# Patient Record
Sex: MALE
[De-identification: names, ages, dates, MRNs, and addresses within clinical notes are randomized per-mention and may not be internally consistent; named-entity substitution may affect disease eponyms.]

---

## 2023-03-17 ENCOUNTER — HOSPITAL ENCOUNTER (OUTPATIENT)
Dept: HOSPITAL 90 - RAH | Age: 78
Discharge: HOME | End: 2023-03-17
Attending: FAMILY MEDICINE
Payer: COMMERCIAL

## 2023-03-17 DIAGNOSIS — M25.562: ICD-10-CM

## 2023-03-17 DIAGNOSIS — M25.561: Primary | ICD-10-CM

## 2023-03-17 PROCEDURE — 73562 X-RAY EXAM OF KNEE 3: CPT

## 2023-04-18 ENCOUNTER — HOSPITAL ENCOUNTER (EMERGENCY)
Dept: HOSPITAL 90 - EDH | Age: 78
LOS: 1 days | Discharge: HOME | End: 2023-04-19
Payer: COMMERCIAL

## 2023-04-18 VITALS — BODY MASS INDEX: 35.48 KG/M2 | HEIGHT: 65 IN | WEIGHT: 212.97 LBS

## 2023-04-18 DIAGNOSIS — Z79.899: ICD-10-CM

## 2023-04-18 DIAGNOSIS — I10: Primary | ICD-10-CM

## 2023-04-18 DIAGNOSIS — L76.22: ICD-10-CM

## 2023-04-18 PROCEDURE — 99281 EMR DPT VST MAYX REQ PHY/QHP: CPT

## 2023-04-19 VITALS — DIASTOLIC BLOOD PRESSURE: 56 MMHG | SYSTOLIC BLOOD PRESSURE: 110 MMHG

## 2023-06-09 ENCOUNTER — HOSPITAL ENCOUNTER (OUTPATIENT)
Dept: HOSPITAL 90 - RAH | Age: 78
Discharge: HOME | End: 2023-06-09
Attending: INTERNAL MEDICINE
Payer: COMMERCIAL

## 2023-06-09 DIAGNOSIS — R10.9: Primary | ICD-10-CM

## 2023-06-09 DIAGNOSIS — K59.00: ICD-10-CM

## 2023-06-09 PROCEDURE — 74150 CT ABDOMEN W/O CONTRAST: CPT

## 2025-01-24 ENCOUNTER — HOSPITAL ENCOUNTER (OUTPATIENT)
Dept: HOSPITAL 90 - RAH | Age: 80
Discharge: HOME | End: 2025-01-24
Attending: ORTHOPAEDIC SURGERY
Payer: COMMERCIAL

## 2025-01-24 DIAGNOSIS — S83.241A: ICD-10-CM
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
## 2025-01-24 DIAGNOSIS — S83.521A: ICD-10-CM
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
## 2025-01-24 DIAGNOSIS — S83.281A: ICD-10-CM
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
## 2025-01-24 DIAGNOSIS — S83.511A: Primary | ICD-10-CM

## 2025-01-24 PROCEDURE — 73721 MRI JNT OF LWR EXTRE W/O DYE: CPT

## 2025-01-24 NOTE — [facility]
MR KNEE RIGHT WO



HISTORY: No additional history given.



COMPARISON: None



TECHNIQUE: MRI of the knee was performed utilizing multiple pulse

sequences in axial, coronal and sagittal planes. Patient was not given

contrast through intravenous route. 



FINDINGS: No abnormal signal intensity is seen of the visualized bony

structure.  There is anterior cruciate ligament tear. There is posterior

cruciate ligament sprain.  The medial and lateral collateral ligaments

are also intact.  Quadriceps tendon and patellar tendon are within

normal limits.  There is intrasubstance tear involving the medial and

lateral menisci. There is lateral meniscal tear involving anterior horn

with inferior extension. There is medial meniscal tear involving the

body with inferior articular extension.   No evidence of Baker's cyst is

seen.



IMPRESSION:

1. Anterior cruciate ligament tear. Posterior cruciate ligament sprain.

Medial meniscal tear. Possible lateral meniscal tear.